# Patient Record
Sex: FEMALE | Race: BLACK OR AFRICAN AMERICAN | NOT HISPANIC OR LATINO | Employment: UNEMPLOYED | URBAN - METROPOLITAN AREA
[De-identification: names, ages, dates, MRNs, and addresses within clinical notes are randomized per-mention and may not be internally consistent; named-entity substitution may affect disease eponyms.]

---

## 2023-10-03 PROBLEM — O09.212 PREVIOUS PRETERM DELIVERY, ANTEPARTUM, SECOND TRIMESTER: Status: ACTIVE | Noted: 2023-10-03

## 2023-10-05 ENCOUNTER — OFFICE VISIT (OUTPATIENT)
Dept: MATERNAL FETAL MEDICINE | Facility: CLINIC | Age: 28
End: 2023-10-05
Payer: MEDICAID

## 2023-10-05 ENCOUNTER — PROCEDURE VISIT (OUTPATIENT)
Dept: MATERNAL FETAL MEDICINE | Facility: CLINIC | Age: 28
End: 2023-10-05
Payer: MEDICAID

## 2023-10-05 VITALS
HEIGHT: 69 IN | SYSTOLIC BLOOD PRESSURE: 104 MMHG | DIASTOLIC BLOOD PRESSURE: 64 MMHG | WEIGHT: 204 LBS | BODY MASS INDEX: 30.21 KG/M2 | HEART RATE: 88 BPM

## 2023-10-05 DIAGNOSIS — O28.3 ECHOGENIC INTRACARDIAC FOCUS OF FETUS ON PRENATAL ULTRASOUND: ICD-10-CM

## 2023-10-05 DIAGNOSIS — O30.042 DICHORIONIC DIAMNIOTIC TWIN PREGNANCY IN SECOND TRIMESTER: ICD-10-CM

## 2023-10-05 DIAGNOSIS — O09.90 AT HIGH RISK FOR COMPLICATIONS OF INTRAUTERINE PREGNANCY (IUP): ICD-10-CM

## 2023-10-05 DIAGNOSIS — O09.212 PREVIOUS PRETERM DELIVERY, ANTEPARTUM, SECOND TRIMESTER: Primary | ICD-10-CM

## 2023-10-05 PROCEDURE — 3008F PR BODY MASS INDEX (BMI) DOCUMENTED: ICD-10-PCS | Mod: CPTII,S$GLB,, | Performed by: OBSTETRICS & GYNECOLOGY

## 2023-10-05 PROCEDURE — 3008F BODY MASS INDEX DOCD: CPT | Mod: CPTII,S$GLB,, | Performed by: OBSTETRICS & GYNECOLOGY

## 2023-10-05 PROCEDURE — 1159F PR MEDICATION LIST DOCUMENTED IN MEDICAL RECORD: ICD-10-PCS | Mod: CPTII,S$GLB,, | Performed by: OBSTETRICS & GYNECOLOGY

## 2023-10-05 PROCEDURE — 3078F DIAST BP <80 MM HG: CPT | Mod: CPTII,S$GLB,, | Performed by: OBSTETRICS & GYNECOLOGY

## 2023-10-05 PROCEDURE — 76811 PR US, OB FETAL EVAL & EXAM, TRANSABDOM,FIRST GESTATION: ICD-10-PCS | Mod: S$GLB,,, | Performed by: OBSTETRICS & GYNECOLOGY

## 2023-10-05 PROCEDURE — 3074F SYST BP LT 130 MM HG: CPT | Mod: CPTII,S$GLB,, | Performed by: OBSTETRICS & GYNECOLOGY

## 2023-10-05 PROCEDURE — 99204 PR OFFICE/OUTPT VISIT, NEW, LEVL IV, 45-59 MIN: ICD-10-PCS | Mod: TH,S$GLB,, | Performed by: OBSTETRICS & GYNECOLOGY

## 2023-10-05 PROCEDURE — 76812 OB US DETAILED ADDL FETUS: ICD-10-PCS | Mod: S$GLB,,, | Performed by: OBSTETRICS & GYNECOLOGY

## 2023-10-05 PROCEDURE — 76811 OB US DETAILED SNGL FETUS: CPT | Mod: S$GLB,,, | Performed by: OBSTETRICS & GYNECOLOGY

## 2023-10-05 PROCEDURE — 3078F PR MOST RECENT DIASTOLIC BLOOD PRESSURE < 80 MM HG: ICD-10-PCS | Mod: CPTII,S$GLB,, | Performed by: OBSTETRICS & GYNECOLOGY

## 2023-10-05 PROCEDURE — 76812 OB US DETAILED ADDL FETUS: CPT | Mod: S$GLB,,, | Performed by: OBSTETRICS & GYNECOLOGY

## 2023-10-05 PROCEDURE — 3074F PR MOST RECENT SYSTOLIC BLOOD PRESSURE < 130 MM HG: ICD-10-PCS | Mod: CPTII,S$GLB,, | Performed by: OBSTETRICS & GYNECOLOGY

## 2023-10-05 PROCEDURE — 76817 TRANSVAGINAL US OBSTETRIC: CPT | Mod: S$GLB,,, | Performed by: OBSTETRICS & GYNECOLOGY

## 2023-10-05 PROCEDURE — 99204 OFFICE O/P NEW MOD 45 MIN: CPT | Mod: TH,S$GLB,, | Performed by: OBSTETRICS & GYNECOLOGY

## 2023-10-05 PROCEDURE — 1159F MED LIST DOCD IN RCRD: CPT | Mod: CPTII,S$GLB,, | Performed by: OBSTETRICS & GYNECOLOGY

## 2023-10-05 PROCEDURE — 76817 PR US, OB, TRANSVAG APPROACH: ICD-10-PCS | Mod: S$GLB,,, | Performed by: OBSTETRICS & GYNECOLOGY

## 2023-10-05 RX ORDER — INDOMETHACIN 25 MG/1
25 CAPSULE ORAL EVERY 8 HOURS
COMMUNITY
Start: 2023-09-24

## 2023-10-05 RX ORDER — ASPIRIN 81 MG/1
81 TABLET ORAL DAILY
Refills: 0 | COMMUNITY
Start: 2023-10-05 | End: 2024-07-11

## 2023-10-05 RX ORDER — ASCORBIC ACID, CHOLECALCIFEROL, DL-.ALPHA.-TOCOPHEROL ACETATE, THIAMINE HYDROCHLORIDE, RIBOFLAVIN, NIACINAMIDE, PYRIDOXINE HYDROCHLORIDE, FOLIC ACID, CYANOCOBALAMIN, CALCIUM CARBONATE, FERROUS FUMARATE, ZINC OXIDE, CUPRIC SULFATE 100; 400; 30; 1.6; 1.6; 20; 3.1; 1; 12; 200; 27; 10; 2 MG/1; [IU]/1; [IU]/1; MG/1; MG/1; MG/1; MG/1; MG/1; UG/1; MG/1; MG/1; MG/1; MG/1
1 TABLET, COATED ORAL
COMMUNITY
Start: 2023-06-27

## 2023-10-05 RX ORDER — PYRIDOXINE HCL (VITAMIN B6) 25 MG
25 TABLET ORAL
COMMUNITY
Start: 2023-07-24

## 2023-10-05 NOTE — PROGRESS NOTES
Maternal Fetal Medicine New Consult    Subjective     Patient ID: [id number]    Chief Complaint: MFM consult with US (H/o Twin pregnancy with PTD at 24 weeks and Twin Pregnancy. )      HPI 28 y.o.  female  at 22w2d gestation with Estimated Date of Delivery: 24. by LMP, consistent with early US. She is sent for MFM consultation for history of  delivery with twins at 24 weeks, currently pregnant with twins.  She had twins in her last pregnancy, and reports at 22 weeks she had normal OB visit and was noted to be fully dilated.  She was placed on bed rest in the hospital in Trendelenburg position.  She reports her water broke in the hospital and she delivered at 24 weeks via .  She reports baby a weighed 1 lb 3 oz and baby B weighed 14 oz. baby a survived until 8 months of age.  Baby B survived, with some developmental delays.  She is currently pregnant with twins.  She has increased BMI of 30 at consult visit.  She had some cramping about a week ago and was prescribed Indocin 25 mg every 8 hours, which she is still taking.  She denies any personal or family history of aneuploidy or anomalies. She denies any exposure to high fevers, viral rashes, illicit drugs or alcohol in this pregnancy.  She denies any leaking fluid, vaginal bleeding, contractions, decreased fetal movement. Denies headaches, visual disturbances, or epigastric pain.    Pregnancy complications include:   Patient Active Problem List   Diagnosis    Previous  delivery (with twins), antepartum, second trimester    Dichorionic diamniotic twin pregnancy in second trimester    At high risk for complications of intrauterine pregnancy (IUP)    Echogenic intracardiac focus of fetus on prenatal ultrasound        History reviewed. No pertinent past medical history.    Past Surgical History:   Procedure Laterality Date     SECTION  2017    twins    GALLBLADDER SURGERY  2019       Family History   Problem Relation Age of Onset  "   Hypertension Maternal Grandmother      labor Sister        Social History     Socioeconomic History    Marital status: Single   Tobacco Use    Smoking status: Former     Types: Cigarettes    Smokeless tobacco: Never   Substance and Sexual Activity    Alcohol use: Not Currently    Drug use: Never    Sexual activity: Yes     Partners: Male       Current Outpatient Medications   Medication Sig Dispense Refill    indomethacin (INDOCIN) 25 MG capsule Take 25 mg by mouth every 8 (eight) hours.       27 mg iron- 1 mg Tab Take 1 tablet by mouth.      VITAMIN B-6 25 MG tablet Take 25 mg by mouth.      aspirin (ECOTRIN) 81 MG EC tablet Take 1 tablet (81 mg total) by mouth once daily.  0     No current facility-administered medications for this visit.       Review of patient's allergies indicates:   Allergen Reactions    Penicillins Rash       Medications:  Current Outpatient Medications   Medication Instructions    aspirin (ECOTRIN) 81 mg, Oral, Daily    indomethacin (INDOCIN) 25 mg, Oral, Every 8 hours     27 mg iron- 1 mg Tab 1 tablet, Oral    VITAMIN B-6 25 mg, Oral       Review of Systems   12 point review of systems conducted, negative except as stated in the history of present illness. See HPI for details.      Objective     Visit Vitals  /64 (BP Location: Left arm, Patient Position: Sitting, BP Method: Large (Automatic))   Pulse 88   Ht 5' 9" (1.753 m)   Wt 92.5 kg (204 lb)   BMI 30.13 kg/m²        Physical Exam  Vitals and nursing note reviewed. Exam conducted with a chaperone present.   Constitutional:       General: She is not in acute distress.     Appearance: Normal appearance.      Comments: Increased BMI   HENT:      Head: Normocephalic and atraumatic.      Nose: Nose normal. No congestion.      Mouth/Throat:      Pharynx: Oropharynx is clear.   Eyes:      General: No scleral icterus.     Pupils: Pupils are equal, round, and reactive to light.   Cardiovascular:      Rate and Rhythm: " Normal rate and regular rhythm.   Pulmonary:      Effort: Pulmonary effort is normal.   Abdominal:      Palpations: Abdomen is soft.      Tenderness: There is no abdominal tenderness. There is no right CVA tenderness, left CVA tenderness or guarding.      Comments: No CVA tenderness gravid uterus.    Musculoskeletal:         General: Normal range of motion.      Cervical back: Neck supple.      Right lower leg: No edema.      Left lower leg: No edema.   Skin:     General: Skin is warm.      Findings: No bruising or rash.   Neurological:      General: No focal deficit present.      Mental Status: She is oriented to person, place, and time.      Deep Tendon Reflexes: Reflexes normal.      Comments: Normal reflexes   Psychiatric:         Mood and Affect: Mood normal.         Behavior: Behavior normal.         Thought Content: Thought content normal.         Judgment: Judgment normal.         ASSESSMENT/PLAN:     28 y.o.  female with IUP at 22w2d        Dichorionic diamniotic twin gestation  Fetal sizes are AGA and concordant.   The EFW percentile for twin A is 49%, and the EFW is 518 g.  The EFW percentile for twin B is 38%, and the EFW is 490 g.  AFV is normal.     Counseled on twin gestation pregnancies. She was advised of the rate of twins at 3.2% appears to have stabilized after increasing over the past few decades. 75% of twins are dizygotic and 25% monozygotic. Among the 25% monozygotic twins, 25% are dichorionic and 75% monochorionic (when embryo splits within 3 days of fertilization).    The higher risk of anomalies with twin gestation and 8% risks in spontaneous pregnancy loss was discussed. The reassurance obtained by the normal ultrasound, the limitations of ultrasound in checking anomalies and need for  evaluation was addressed. There is also higher risk of aneuploidy with dizygotic twins, which is twice the risk based on background maternal age or egg-donor age.    Additionally, the  increased risk of  labor and delivery was discussed.  There is no prevention recommended with no history of previous  delivery.      There is data to show that vaginal progesterone, is helpful for decreasing risk of  delivery with buckner, but data is not conclusive for twins and is still considered investigational at this time, . [place] cervical surveillance is not known. She was advised to come in at anytime if there is increased cramping, vaginal discharge, or bleeding.     Additionally, prophylactic cerclage, in an asymptomatic patient, even in the face of a short cervix is not recommended as it may increase risk of  delivery. Neither Bed rest nor tocolytic therapy are effective in prolonging pregnancy and are not recommended. Diagnostic tests that may predict  labor and delivery like fetal fibronectin test and transvaginal ultrasound are not recommended in the asymptomatic patients as they are not known to prolong pregnancy nor improve outcomes.    Use aspirin as discussed.    Additionally, there is higher risk of fetal growth restriction, hypertensive disorders, gestational diabetes mellitus, anemia, cerebral palsy postpartum hemorrhage and slight increase in maternal mortality.  I discussed with her of the need for serial follow up ultrasounds every 4 weeks till the end of pregnancy. If evidence of fetal growth restriction or other complications occur, then closer fetal surveillance will be needed.  If no additional risk factors are present, then optimal time of delivery is at 38 weeks with dichorionic diamniotic twins.      History of  delivery at 24 weeks with twins  I discussed with her the increased risk of recurrence of another  delivery with risk around 1/3. The risk ranges from 15% with one previous  delivery after 32 weeks that was followed by a term at birth to 60% with history of 2 consecutive  deliveries before 32 weeks. I have shared  with her that the  delivery could occur at an earlier gestational age with more significant morbidity and high risk of  mortality associated with that.    Discussed withdrawal of 17P by FDA and new guidelines for management of previous  delivery. If cervical length is less than 3 cm, weekly TVUS is recommended and may consider vaginal progesterone nightly. If cervix is less than 25 mm, consider cerclage, especially if previous  delivery less 28 weeks, than along with continuing vaginal progesterone nightly. Conversely, may consider nightly vaginal progesterone nightly starting at 16 weeks until 37 weeks regardless of cervical length, although no data of benefit if cervix longer than 3 cm.     TVUS today 10/05/2023  with normal closed cervix 4.3 cm without funneling at the IO. No evidence of cervical insufficiency at this time. PTL precautions given.      At high risk for preeclampsia  With her risk factors for preeclampsia including multifetal pregnancy , BMI over 30,  ancestry, low socioeconomic status, and previous low birthweight or SGA baby, discussed recommendations for low dose aspirin use to decrease risks for adverse outcomes, including preeclampsia, low birth rate and  delivery. Low-dose aspirin reduced the risk for preeclampsia by 24% in clinical trials and reduced the risk for  birth by 14% and FGR by 20%.  After discussing risks/benefits of its use, it was agreed to start asa 81 mg daily today and continue until delivery.. Also, recommend using in all future pregnancies.      EIF as isolated marker for aneuploidy (Twin B)  I discussed the significance of that finding with higher risk of Down syndrome about up to one-and-half to two-fold higher above what baseline risk is based on maternal age and or quad screen results.. Patient was offered genetic amniocentesis, after thorough counseling on benefits and risks of procedure, with very remote risk of  complications and in some studies no identifiable increased risk, above background risk of spontaneous miscarriage, while some current data suggests risk up to 1 in 800 with early amniocentesis prior to 24 weeks, and remote risk of need for emergency delivery with all the complications of prematurity with amniocentesis after 24 weeks. She declined amniocentesis . She is aware of the small risk of aneuploidy and need for  evaluation.  Additionally, the limitation of ultrasound in checking for anomalies and the need for  evaluation was emphasized.    She was counseled on Cell free DNA understanding that it is an enhanced screening test but not a diagnostic test. It assesses risk for common aneuploidies such as Trisomy 13, 18, and 21 by evaluating cell-free fetal DNA in maternal circulation.  Discussed the limited data on cell free DNA in twin pregnancies. ACOG suggests normal cfDNA in twin pregnancy can be reassuring for risk of Trisomy 21, but accuracy is not established for trisomy 13 and 18.  She declined any additional testing at this time.    Patient was advised that with a negative cell free DNA or negative quad screen and EIF, no additional testing is recommended.  However an amniocentesis was offered as above and declined    She was counseled that an echogenic intracardiac focus, if no aneuploidy is present and no congenital heart disease is confirmed at birth, does not have any long-term sequela and does not need any further evaluation.        Report of cramps 1 week ago  With no evidence of  labor and resolution of cramps, advised patient to stop taking Indocin at this time.      Follow up in about 4 weeks (around 2023) for [facility] follow-up, Repeat ultrasound, Twins.     Future Appointments   Date Time Provider Department Center   10/31/2023 10:30 AM Moustapha Atkins MD [facility] [place]   10/31/2023 10:30 AM ROOM 1 AND 2, [facility] [place]        Counseling on  dichorionic diamniotic twins was done with plan of care and discussed as above.  Patient will continue daily aspirin 81 mg to deliver repeated with cervical length over 4 cm, there is no benefit of vaginal progesterone note of continued Indocin at this time.  Patient is aware of the high risk of  labor and delivery with twins especially with a history of previous  delivery.  Expectant management will be done.  Counseling on EIF done.  We do not have copy of the quad screen or cell free DNA if done.  Will try to check with Dr. Rosales's office.  If quad screen or cell free DNA was done is negative no further testing would be recommended.  If not done, patient did not want to have any genetic testing at this time be The option of an amniocentesis was discussed and agreed to avoid.    Patient was evaluated by TAYLOR Pacheco and Dr. Atkins.  Final assessment and recommendations as stated above were made by Dr. Atkins.    Components of this note were documented using voice recognition systems; and are subject to errors not corrected at proof reading.  Please contact the author for any clarifications.

## 2023-10-30 DIAGNOSIS — O30.042 DICHORIONIC DIAMNIOTIC TWIN PREGNANCY IN SECOND TRIMESTER: Primary | ICD-10-CM

## 2023-10-31 ENCOUNTER — PROCEDURE VISIT (OUTPATIENT)
Dept: MATERNAL FETAL MEDICINE | Facility: CLINIC | Age: 28
End: 2023-10-31
Payer: MEDICAID

## 2023-10-31 ENCOUNTER — TELEPHONE (OUTPATIENT)
Dept: MATERNAL FETAL MEDICINE | Facility: CLINIC | Age: 28
End: 2023-10-31

## 2023-10-31 ENCOUNTER — OFFICE VISIT (OUTPATIENT)
Dept: MATERNAL FETAL MEDICINE | Facility: CLINIC | Age: 28
End: 2023-10-31
Payer: MEDICAID

## 2023-10-31 VITALS
HEART RATE: 96 BPM | SYSTOLIC BLOOD PRESSURE: 104 MMHG | BODY MASS INDEX: 30.21 KG/M2 | DIASTOLIC BLOOD PRESSURE: 69 MMHG | WEIGHT: 204 LBS | HEIGHT: 69 IN

## 2023-10-31 DIAGNOSIS — O30.042 DICHORIONIC DIAMNIOTIC TWIN PREGNANCY IN SECOND TRIMESTER: ICD-10-CM

## 2023-10-31 DIAGNOSIS — O09.212 PREVIOUS PRETERM DELIVERY, ANTEPARTUM, SECOND TRIMESTER: ICD-10-CM

## 2023-10-31 DIAGNOSIS — O09.90 AT HIGH RISK FOR COMPLICATIONS OF INTRAUTERINE PREGNANCY (IUP): Primary | ICD-10-CM

## 2023-10-31 DIAGNOSIS — O28.3 ECHOGENIC INTRACARDIAC FOCUS OF FETUS ON PRENATAL ULTRASOUND: ICD-10-CM

## 2023-10-31 PROCEDURE — 76816 OB US FOLLOW-UP PER FETUS: CPT | Mod: 59,S$GLB,, | Performed by: OBSTETRICS & GYNECOLOGY

## 2023-10-31 PROCEDURE — 3074F PR MOST RECENT SYSTOLIC BLOOD PRESSURE < 130 MM HG: ICD-10-PCS | Mod: CPTII,S$GLB,, | Performed by: OBSTETRICS & GYNECOLOGY

## 2023-10-31 PROCEDURE — 3078F PR MOST RECENT DIASTOLIC BLOOD PRESSURE < 80 MM HG: ICD-10-PCS | Mod: CPTII,S$GLB,, | Performed by: OBSTETRICS & GYNECOLOGY

## 2023-10-31 PROCEDURE — 76816 PR  US,PREGNANT UTERUS,F/U,TRANSABD APP: ICD-10-PCS | Mod: 59,S$GLB,, | Performed by: OBSTETRICS & GYNECOLOGY

## 2023-10-31 PROCEDURE — 3078F DIAST BP <80 MM HG: CPT | Mod: CPTII,S$GLB,, | Performed by: OBSTETRICS & GYNECOLOGY

## 2023-10-31 PROCEDURE — 3074F SYST BP LT 130 MM HG: CPT | Mod: CPTII,S$GLB,, | Performed by: OBSTETRICS & GYNECOLOGY

## 2023-10-31 PROCEDURE — 99213 PR OFFICE/OUTPT VISIT, EST, LEVL III, 20-29 MIN: ICD-10-PCS | Mod: TH,S$GLB,, | Performed by: OBSTETRICS & GYNECOLOGY

## 2023-10-31 PROCEDURE — 1159F PR MEDICATION LIST DOCUMENTED IN MEDICAL RECORD: ICD-10-PCS | Mod: CPTII,S$GLB,, | Performed by: OBSTETRICS & GYNECOLOGY

## 2023-10-31 PROCEDURE — 1159F MED LIST DOCD IN RCRD: CPT | Mod: CPTII,S$GLB,, | Performed by: OBSTETRICS & GYNECOLOGY

## 2023-10-31 PROCEDURE — 3008F BODY MASS INDEX DOCD: CPT | Mod: CPTII,S$GLB,, | Performed by: OBSTETRICS & GYNECOLOGY

## 2023-10-31 PROCEDURE — 99213 OFFICE O/P EST LOW 20 MIN: CPT | Mod: TH,S$GLB,, | Performed by: OBSTETRICS & GYNECOLOGY

## 2023-10-31 PROCEDURE — 3008F PR BODY MASS INDEX (BMI) DOCUMENTED: ICD-10-PCS | Mod: CPTII,S$GLB,, | Performed by: OBSTETRICS & GYNECOLOGY

## 2023-10-31 RX ORDER — PROMETHAZINE HYDROCHLORIDE 12.5 MG/1
25 TABLET ORAL 2 TIMES DAILY
COMMUNITY
Start: 2023-07-20

## 2023-10-31 NOTE — PROGRESS NOTES
Maternal Fetal Medicine Follow Up Consult    Subjective     Patient ID: [id number]    Chief Complaint: MFM gollow up w/us , PELVIC PRESSURE, and Pelvic Pain (Pt c/o loosing mucus plug, and slight bleeding )      HPI: Emma Ladd is a 28 y.o. female  at 26w0d gestation with Estimated Date of Delivery: 24  who is here for follow  up consultation by [facility].  She had twins in her last pregnancy, and reports at 22 weeks she had normal OB visit and was noted to be fully dilated.  She was placed on bed rest in the hospital in Trendelenburg position.  She reports her water broke in the hospital and she delivered at 24 weeks via .  She reports baby a weighed 1 lb 3 oz and baby B weighed 14 oz. baby a survived until 8 months of age.  Baby B survived, with some developmental delays.  She currently has dichorionic diamniotic twin gestation.  She has increased BMI of 30 at consult visit.  She had some cramping previously and was prescribed Indocin 25 mg every 8 hours. The cramps have resolved.  She had EIF on twin B, and she declined any genetic testing. She is on low dose aspirin daily.         Interval history since last M visit: None.. She denies any leaking fluid, vaginal bleeding, contractions, decreased fetal movement. Denies headaches, visual disturbances, or epigastric pain.    Pregnancy complications include:   Patient Active Problem List   Diagnosis    Previous  delivery (with twins), antepartum, second trimester    Dichorionic diamniotic twin pregnancy in second trimester    At high risk for complications of intrauterine pregnancy (IUP)    Echogenic intracardiac focus of fetus on prenatal ultrasound        No changes to medical, surgical, family, social, or obstetric history.    Medications:  Current Outpatient Medications   Medication Instructions    aspirin (ECOTRIN) 81 mg, Oral, Daily    indomethacin (INDOCIN) 25 mg, Oral, Every 8 hours    promethazine (PHENERGAN) 25 mg, Oral, 2 times  "daily     27 mg iron- 1 mg Tab 1 tablet, Oral    VITAMIN B-6 25 mg, Oral       Review of Systems   12 point review of systems conducted, negative except as stated in the history of present illness. See HPI for details.      Objective     Visit Vitals  /69 (BP Location: Left arm, Patient Position: Sitting, BP Method: Medium (Automatic))   Pulse 96   Ht 5' 9" (1.753 m)   Wt 92.5 kg (204 lb)   BMI 30.13 kg/m²          Physical Exam  Vitals and nursing note reviewed.   Constitutional:       Appearance: Normal appearance.      Comments: Increased BMI   HENT:      Head: Normocephalic and atraumatic.      Nose: Nose normal. No congestion.   Cardiovascular:      Rate and Rhythm: Normal rate.   Pulmonary:      Effort: Pulmonary effort is normal.   Skin:     Findings: No rash.   Neurological:      Mental Status: She is alert and oriented to person, place, and time.   Psychiatric:         Mood and Affect: Mood normal.         Behavior: Behavior normal.         Thought Content: Thought content normal.         Judgment: Judgment normal.           ASSESSMENT/PLAN:     28 y.o.  female with IUP at 26w0d    Dichorionic diamniotic twin gestation  Interval growth for both twins has been adequate.   Fetal sizes are AGA and concordant with FGD 2.5%.   The EFW percentile for twin A is 36%, and the EFW is 822 g on 10/31/2023.  The EFW percentile for twin B is 40%, and the EFW is 843 g on 10/31/2023.  AFV is normal.     She is aware of risks associated with twins including higher risk of anomalies, fetal growth restriction, hypertensive disorders, gestational diabetes, anemia, cerebral palsy,  labor/delivery and postpartum hemorrhage with twin gestation.  She is aware of the limitations of ultrasound, including checking anomalies, with need for  evaluation.    Low dose aspirin as discussed.    Due to increased risk factors, recommend continue serial follow-up ultrasounds every 4 weeks till end of " pregnancy.  If evidence of fetal growth restriction or other complications occur, then closer fetal surveillance will be needed.    If no additional risk factors are present, then optimal time of delivery is at 38 weeks with dichorionic diamniotic twins. The importance of doing fetal kick counts three times a day and resting in a lateral recumbent position and reporting immediately at any time there is any decreased fetal movement even if the same day of testing was emphasized. She is to come in at anytime if there is increased cramping, vaginal discharge, vaginal bleeding, or decreased fetal movement.         History of  delivery at 24 weeks with twins  She is history of previous  delivery and is aware of the risk of recurrence of  delivery.  She had cervical length surveillance, which ruled out cervical incompetence..  PTL precautions reviewed.    With cervix at 4.3 cm, patient elected to avoid vaginal progesterone at this time, after discussing potential benefits and risks and uncertainties of such use.       At high risk for preeclampsia  With her increased risk for preeclampsia, she agreed to continue asa 81 mg daily until delivery.. Preeclampsia precautions reviewed.        EIF as isolated marker for aneuploidy (Twin B)  She declined cfDNA . She declined amniocentesis . She is aware of the need for routine  evaluation.    Patient was advised that with a negative cell free DNA or negative quad screen and EIF, no additional testing is recommended.      She was counseled that an echogenic intracardiac focus, if no aneuploidy is present and no congenital heart disease is confirmed at birth, does not have any long-term sequela and does not need any further evaluation.      There is normal fetal growth on both fetuses.  We will get a copy of the  report in view of the high-risk of having a classical  with a 24 week  section.      Follow up in about 4 weeks  (around 11/28/2023) for [facility] follow-up, Repeat ultrasound, Twins.     Future Appointments   Date Time Provider Department Center   11/28/2023  2:00 PM Moustapha Atkins MD [facility] [place]   11/28/2023  2:00 PM ROOM 1 AND 2, [facility] [place]        [place] involvement: Patient was evaluated and examined by Dr. Atkins. TAYLOR Pacheco, helped in pre charting of part of note.    Components of this note were documented using voice recognition systems and are subject to errors not corrected at proofreading. Please contact the author for any clarifications.

## 2023-11-27 DIAGNOSIS — O30.042 DICHORIONIC DIAMNIOTIC TWIN PREGNANCY IN SECOND TRIMESTER: ICD-10-CM

## 2023-11-27 DIAGNOSIS — O09.212 PREVIOUS PRETERM DELIVERY, ANTEPARTUM, SECOND TRIMESTER: ICD-10-CM

## 2023-11-27 DIAGNOSIS — O28.3 ECHOGENIC INTRACARDIAC FOCUS OF FETUS ON PRENATAL ULTRASOUND: ICD-10-CM

## 2023-11-27 DIAGNOSIS — O09.90 AT HIGH RISK FOR COMPLICATIONS OF INTRAUTERINE PREGNANCY (IUP): Primary | ICD-10-CM

## 2023-11-28 ENCOUNTER — PROCEDURE VISIT (OUTPATIENT)
Dept: MATERNAL FETAL MEDICINE | Facility: CLINIC | Age: 28
End: 2023-11-28
Payer: MEDICAID

## 2023-11-28 ENCOUNTER — OFFICE VISIT (OUTPATIENT)
Dept: MATERNAL FETAL MEDICINE | Facility: CLINIC | Age: 28
End: 2023-11-28
Payer: MEDICAID

## 2023-11-28 VITALS
HEART RATE: 102 BPM | BODY MASS INDEX: 30.81 KG/M2 | HEIGHT: 69 IN | DIASTOLIC BLOOD PRESSURE: 63 MMHG | SYSTOLIC BLOOD PRESSURE: 110 MMHG | WEIGHT: 208 LBS

## 2023-11-28 DIAGNOSIS — O09.90 AT HIGH RISK FOR COMPLICATIONS OF INTRAUTERINE PREGNANCY (IUP): ICD-10-CM

## 2023-11-28 DIAGNOSIS — O30.042 DICHORIONIC DIAMNIOTIC TWIN PREGNANCY IN SECOND TRIMESTER: Primary | ICD-10-CM

## 2023-11-28 DIAGNOSIS — O09.212 PREVIOUS PRETERM DELIVERY, ANTEPARTUM, SECOND TRIMESTER: ICD-10-CM

## 2023-11-28 DIAGNOSIS — O28.3 ECHOGENIC INTRACARDIAC FOCUS OF FETUS ON PRENATAL ULTRASOUND: ICD-10-CM

## 2023-11-28 DIAGNOSIS — O30.042 DICHORIONIC DIAMNIOTIC TWIN PREGNANCY IN SECOND TRIMESTER: ICD-10-CM

## 2023-11-28 PROCEDURE — 99213 PR OFFICE/OUTPT VISIT, EST, LEVL III, 20-29 MIN: ICD-10-PCS | Mod: TH,S$GLB,, | Performed by: OBSTETRICS & GYNECOLOGY

## 2023-11-28 PROCEDURE — 99213 OFFICE O/P EST LOW 20 MIN: CPT | Mod: TH,S$GLB,, | Performed by: OBSTETRICS & GYNECOLOGY

## 2023-11-28 PROCEDURE — 3008F PR BODY MASS INDEX (BMI) DOCUMENTED: ICD-10-PCS | Mod: CPTII,S$GLB,, | Performed by: OBSTETRICS & GYNECOLOGY

## 2023-11-28 PROCEDURE — 3074F SYST BP LT 130 MM HG: CPT | Mod: CPTII,S$GLB,, | Performed by: OBSTETRICS & GYNECOLOGY

## 2023-11-28 PROCEDURE — 1159F MED LIST DOCD IN RCRD: CPT | Mod: CPTII,S$GLB,, | Performed by: OBSTETRICS & GYNECOLOGY

## 2023-11-28 PROCEDURE — 3008F BODY MASS INDEX DOCD: CPT | Mod: CPTII,S$GLB,, | Performed by: OBSTETRICS & GYNECOLOGY

## 2023-11-28 PROCEDURE — 3074F PR MOST RECENT SYSTOLIC BLOOD PRESSURE < 130 MM HG: ICD-10-PCS | Mod: CPTII,S$GLB,, | Performed by: OBSTETRICS & GYNECOLOGY

## 2023-11-28 PROCEDURE — 3078F PR MOST RECENT DIASTOLIC BLOOD PRESSURE < 80 MM HG: ICD-10-PCS | Mod: CPTII,S$GLB,, | Performed by: OBSTETRICS & GYNECOLOGY

## 2023-11-28 PROCEDURE — 76816 OB US FOLLOW-UP PER FETUS: CPT | Mod: S$GLB,,, | Performed by: OBSTETRICS & GYNECOLOGY

## 2023-11-28 PROCEDURE — 76816 PR  US,PREGNANT UTERUS,F/U,TRANSABD APP: ICD-10-PCS | Mod: 59,S$GLB,, | Performed by: OBSTETRICS & GYNECOLOGY

## 2023-11-28 PROCEDURE — 3078F DIAST BP <80 MM HG: CPT | Mod: CPTII,S$GLB,, | Performed by: OBSTETRICS & GYNECOLOGY

## 2023-11-28 PROCEDURE — 1159F PR MEDICATION LIST DOCUMENTED IN MEDICAL RECORD: ICD-10-PCS | Mod: CPTII,S$GLB,, | Performed by: OBSTETRICS & GYNECOLOGY

## 2023-11-28 RX ORDER — NIFEDIPINE 10 MG/1
10 CAPSULE ORAL EVERY 8 HOURS
COMMUNITY
Start: 2023-11-04

## 2023-11-28 NOTE — PROGRESS NOTES
Maternal Fetal Medicine Follow Up Consult    Subjective     Patient ID: [id number]    Chief Complaint: MFM follow up with US (H/o Twins.  Twin pregnancy.  Patient is having swelling , blurred vision, contractions dilated to 2 cm., fainting.)      HPI: Emma Ladd is a 28 y.o. female  at 30w0d gestation with Estimated Date of Delivery: 24  who is here for follow  up consultation by MFM.  She had twins in her last pregnancy, and reports at 22 weeks she had normal OB visit and was noted to be fully dilated.  She was placed on bed rest in the hospital in Trendelenburg position.  She reports her water broke in the hospital and she delivered at 24 weeks via .  We are requesting the op report in view of the high risk needing a classical  at 24 weeks. She reports baby a weighed 1 lb 3 oz and baby B weighed 14 oz. baby a survived until 8 months of age.  Baby B survived, with some developmental delays.  She currently has dichorionic diamniotic twin gestation.  She has increased BMI of 30 at consult visit.  She had some cramping previously and was prescribed Indocin 25 mg every 8 hours. The cramps have resolved.  She had EIF on twin B, and she declined any genetic testing. She is on low dose aspirin daily.         Interval history since last MFM visit:  Patient was seen in the hospital around 2023 for contractions where she was maybe about 2 cm dilated stable we will give her magnesium sulfate and steroids.  A follow-up visit on  showed no change in cervical dilatation.  No cramps today.. She denies any leaking fluid, vaginal bleeding, contractions, decreased fetal movement. Denies headaches, visual disturbances, or epigastric pain.    Pregnancy complications include:   Patient Active Problem List   Diagnosis    Previous  delivery (with twins), antepartum, second trimester    Dichorionic diamniotic twin pregnancy in second trimester    At high risk for  "complications of intrauterine pregnancy (IUP)    Echogenic intracardiac focus of fetus on prenatal ultrasound- NOT SEEN         No changes to medical, surgical, family, social, or obstetric history.    Medications:  Current Outpatient Medications   Medication Instructions    aspirin (ECOTRIN) 81 mg, Oral, Daily    indomethacin (INDOCIN) 25 mg, Oral, Every 8 hours    NIFEdipine (PROCARDIA) 10 mg, Oral, Every 8 hours    promethazine (PHENERGAN) 25 mg, Oral, 2 times daily     27 mg iron- 1 mg Tab 1 tablet, Oral    VITAMIN B-6 25 mg, Oral       Review of Systems   12 point review of systems conducted, negative except as stated in the history of present illness. See HPI for details.      Objective     Visit Vitals  /63 (BP Location: Left arm, Patient Position: Sitting, BP Method: Large (Automatic))   Pulse 102   Ht 5' 9" (1.753 m)   Wt 94.3 kg (208 lb)   BMI 30.72 kg/m²            Physical Exam  Vitals and nursing note reviewed.   Constitutional:       Appearance: Normal appearance.      Comments: Increased BMI   HENT:      Head: Normocephalic and atraumatic.      Nose: Nose normal. No congestion.   Cardiovascular:      Rate and Rhythm: Normal rate.   Pulmonary:      Effort: Pulmonary effort is normal.   Skin:     Findings: No rash.   Neurological:      Mental Status: She is alert and oriented to person, place, and time.   Psychiatric:         Mood and Affect: Mood normal.         Behavior: Behavior normal.         Thought Content: Thought content normal.         Judgment: Judgment normal.           ASSESSMENT/PLAN:     28 y.o.  female with IUP at 30w0d    Dichorionic diamniotic twin gestation  A = Fetal size is AGA with the EFW 1443g at the 33%.  AC is at the 23% on 2023.  MVP is normal.    B = Fetal size is AGA with the EFW 1493g at the 37%. AC is at the 65% on 2023.  MVP is normal.      Growth discordance is 3.3 %.     She is aware of risks associated with twins including higher " risk of anomalies, fetal growth restriction, hypertensive disorders, gestational diabetes, anemia, cerebral palsy,  labor/delivery and postpartum hemorrhage with twin gestation.  She is aware of the limitations of ultrasound, including checking anomalies, with need for  evaluation.    Low dose aspirin as discussed.    Due to increased risk factors, recommend continue serial follow-up ultrasounds every 4 weeks till end of pregnancy.  If evidence of fetal growth restriction or other complications occur, then closer fetal surveillance will be needed.    If no additional risk factors are present, then optimal time of delivery is at 38 weeks with dichorionic diamniotic twins. The importance of doing fetal kick counts three times a day and resting in a lateral recumbent position and reporting immediately at any time there is any decreased fetal movement even if the same day of testing was emphasized. She is to come in at anytime if there is increased cramping, vaginal discharge, vaginal bleeding, or decreased fetal movement.         History of  delivery at 24 weeks with twins  She is history of previous  delivery and is aware of the risk of recurrence of  delivery.  She had cervical length surveillance, which ruled out cervical incompetence..  PTL precautions reviewed.      At high risk for preeclampsia  With her increased risk for preeclampsia, she agreed to continue asa 81 mg daily until delivery.. Preeclampsia precautions reviewed.      EIF as isolated marker for aneuploidy (Twin B) - NOT SEEN   She declined cfDNA . She declined amniocentesis . She is aware of the need for routine  evaluation.        There is normal fetal growth on both fetuses.  We will get a copy of the  report in view of the high-risk of having a classical  with a 24 week  section.        There is normal fetal growth on both fetuses.  With the EIF not seen.  She is to  continue daily aspirin 81 mg.  Will plan to see her 1 more time in 4 weeks.  Preeclampsia warnings and fetal kick count instructions.   labor instructions.  Still trying to get a copy of the  section we will request record again.    Follow up in about 4 weeks (around 2023) for MFM follow-up, Repeat ultrasound, Twins.     No future appointments.       [place] involvement: Patient was evaluated by TAYLOR Pacheco and Dr. Atkins.  Final assessment and recommendations as stated above were made by Dr. Atkins.    Components of this note were documented using voice recognition systems and are subject to errors not corrected at proofreading. Please contact the author for any clarifications.

## 2023-12-01 ENCOUNTER — DOCUMENTATION ONLY (OUTPATIENT)
Dept: MATERNAL FETAL MEDICINE | Facility: CLINIC | Age: 28
End: 2023-12-01
Payer: MEDICAID

## 2023-12-01 NOTE — PROGRESS NOTES
Operative report from patient's previous  from 2017 received.  Confirm that it was a classical  with a vertical incision.  We will address this at her next visit on 2023.

## 2023-12-18 DIAGNOSIS — O28.3 ECHOGENIC INTRACARDIAC FOCUS OF FETUS ON PRENATAL ULTRASOUND: Primary | ICD-10-CM

## 2023-12-18 DIAGNOSIS — O30.042 DICHORIONIC DIAMNIOTIC TWIN PREGNANCY IN SECOND TRIMESTER: ICD-10-CM

## 2023-12-18 DIAGNOSIS — O09.212 PREVIOUS PRETERM DELIVERY, ANTEPARTUM, SECOND TRIMESTER: ICD-10-CM

## 2023-12-26 PROBLEM — O09.213 PREVIOUS PRETERM DELIVERY IN THIRD TRIMESTER, ANTEPARTUM: Status: ACTIVE | Noted: 2023-10-03

## 2023-12-26 PROBLEM — Z98.891 HISTORY OF CLASSICAL CESAREAN SECTION: Status: ACTIVE | Noted: 2023-12-26

## 2023-12-26 PROBLEM — O28.3 ECHOGENIC INTRACARDIAC FOCUS OF FETUS ON PRENATAL ULTRASOUND: Status: RESOLVED | Noted: 2023-10-05 | Resolved: 2023-12-26

## 2023-12-26 PROBLEM — O30.043 DICHORIONIC DIAMNIOTIC TWIN PREGNANCY IN THIRD TRIMESTER: Status: ACTIVE | Noted: 2023-10-05

## 2023-12-29 ENCOUNTER — OFFICE VISIT (OUTPATIENT)
Dept: MATERNAL FETAL MEDICINE | Facility: CLINIC | Age: 28
End: 2023-12-29
Payer: MEDICAID

## 2023-12-29 ENCOUNTER — PROCEDURE VISIT (OUTPATIENT)
Dept: MATERNAL FETAL MEDICINE | Facility: CLINIC | Age: 28
End: 2023-12-29
Payer: MEDICAID

## 2023-12-29 VITALS
SYSTOLIC BLOOD PRESSURE: 123 MMHG | WEIGHT: 216.81 LBS | DIASTOLIC BLOOD PRESSURE: 78 MMHG | HEART RATE: 85 BPM | HEIGHT: 69 IN | BODY MASS INDEX: 32.11 KG/M2

## 2023-12-29 DIAGNOSIS — O36.5931 POOR FETAL GROWTH AFFECTING MANAGEMENT OF MOTHER IN THIRD TRIMESTER, FETUS 1 OF MULTIPLE GESTATION: Primary | ICD-10-CM

## 2023-12-29 DIAGNOSIS — O30.042 DICHORIONIC DIAMNIOTIC TWIN PREGNANCY IN SECOND TRIMESTER: ICD-10-CM

## 2023-12-29 DIAGNOSIS — O36.5932 POOR FETAL GROWTH AFFECTING MANAGEMENT OF MOTHER IN THIRD TRIMESTER, FETUS 2: ICD-10-CM

## 2023-12-29 DIAGNOSIS — O28.3 ECHOGENIC INTRACARDIAC FOCUS OF FETUS ON PRENATAL ULTRASOUND: ICD-10-CM

## 2023-12-29 DIAGNOSIS — O36.5931 POOR FETAL GROWTH AFFECTING MANAGEMENT OF MOTHER IN THIRD TRIMESTER, FETUS 1 OF MULTIPLE GESTATION: ICD-10-CM

## 2023-12-29 DIAGNOSIS — O30.043 DICHORIONIC DIAMNIOTIC TWIN PREGNANCY IN THIRD TRIMESTER: ICD-10-CM

## 2023-12-29 DIAGNOSIS — O09.212 PREVIOUS PRETERM DELIVERY, ANTEPARTUM, SECOND TRIMESTER: ICD-10-CM

## 2023-12-29 DIAGNOSIS — O09.90 AT HIGH RISK FOR COMPLICATIONS OF INTRAUTERINE PREGNANCY (IUP): Primary | ICD-10-CM

## 2023-12-29 DIAGNOSIS — O09.213 PREVIOUS PRETERM DELIVERY IN THIRD TRIMESTER, ANTEPARTUM: ICD-10-CM

## 2023-12-29 DIAGNOSIS — O28.3 ABNORMAL PRENATAL ULTRASOUND: ICD-10-CM

## 2023-12-29 DIAGNOSIS — Z98.891 HISTORY OF CLASSICAL CESAREAN SECTION: ICD-10-CM

## 2023-12-29 PROBLEM — O36.5930 POOR FETAL GROWTH AFFECTING MANAGEMENT OF MOTHER IN THIRD TRIMESTER: Status: ACTIVE | Noted: 2023-12-29

## 2023-12-29 PROCEDURE — 3008F PR BODY MASS INDEX (BMI) DOCUMENTED: ICD-10-PCS | Mod: CPTII,S$GLB,,

## 2023-12-29 PROCEDURE — 76819 FETAL BIOPHYS PROFIL W/O NST: CPT | Mod: S$GLB,,, | Performed by: OBSTETRICS & GYNECOLOGY

## 2023-12-29 PROCEDURE — 1160F PR REVIEW ALL MEDS BY PRESCRIBER/CLIN PHARMACIST DOCUMENTED: ICD-10-PCS | Mod: CPTII,S$GLB,,

## 2023-12-29 PROCEDURE — 3078F DIAST BP <80 MM HG: CPT | Mod: CPTII,S$GLB,,

## 2023-12-29 PROCEDURE — 76820 UMBILICAL ARTERY ECHO: CPT | Mod: S$GLB,,, | Performed by: OBSTETRICS & GYNECOLOGY

## 2023-12-29 PROCEDURE — 76819 FETAL BIOPHYS PROFIL W/O NST: CPT | Mod: 59,S$GLB,, | Performed by: OBSTETRICS & GYNECOLOGY

## 2023-12-29 PROCEDURE — 3074F SYST BP LT 130 MM HG: CPT | Mod: CPTII,S$GLB,,

## 2023-12-29 PROCEDURE — 76816 OB US FOLLOW-UP PER FETUS: CPT | Mod: S$GLB,,, | Performed by: OBSTETRICS & GYNECOLOGY

## 2023-12-29 PROCEDURE — 3008F BODY MASS INDEX DOCD: CPT | Mod: CPTII,S$GLB,,

## 2023-12-29 PROCEDURE — 1159F MED LIST DOCD IN RCRD: CPT | Mod: CPTII,S$GLB,,

## 2023-12-29 PROCEDURE — 76816 OB US FOLLOW-UP PER FETUS: CPT | Mod: 59,S$GLB,, | Performed by: OBSTETRICS & GYNECOLOGY

## 2023-12-29 PROCEDURE — 1160F RVW MEDS BY RX/DR IN RCRD: CPT | Mod: CPTII,S$GLB,,

## 2023-12-29 PROCEDURE — 1159F PR MEDICATION LIST DOCUMENTED IN MEDICAL RECORD: ICD-10-PCS | Mod: CPTII,S$GLB,,

## 2023-12-29 PROCEDURE — 3078F PR MOST RECENT DIASTOLIC BLOOD PRESSURE < 80 MM HG: ICD-10-PCS | Mod: CPTII,S$GLB,,

## 2023-12-29 PROCEDURE — 3074F PR MOST RECENT SYSTOLIC BLOOD PRESSURE < 130 MM HG: ICD-10-PCS | Mod: CPTII,S$GLB,,

## 2023-12-29 PROCEDURE — 99215 OFFICE O/P EST HI 40 MIN: CPT | Mod: TH,S$GLB,,

## 2023-12-29 PROCEDURE — 99215 PR OFFICE/OUTPT VISIT, EST, LEVL V, 40-54 MIN: ICD-10-PCS | Mod: TH,S$GLB,,

## 2023-12-29 NOTE — PROGRESS NOTES
Maternal Fetal Medicine Follow Up    Subjective     Patient ID: [id number]    Chief Complaint: mfm followup w/us      HPI: Emma Ladd is a 28 y.o. female  at 34w3d gestation with Estimated Date of Delivery: 24  who is here for follow  up consultation by M.    She had twins in her last pregnancy, and reports at 22 weeks she had normal OB visit and was noted to be fully dilated.  She was placed on bed rest in the hospital in Trendelenburg position.  She reports her water broke in the hospital and she delivered at 24 weeks via .  We reviewed the op report from this delivery and confirmed that  was classical.  She reports Baby A weighed 1 lb 3 oz and Baby B weighed 14 oz. Baby A survived until 8 months of age.  Baby B survived, with some developmental delays.  She currently has dichorionic diamniotic twin gestation.  She has increased BMI of 30 at consult visit. She had EIF on twin B, not seen on follow-up ultrasound, and she declined any genetic testing. She is on low dose aspirin daily. She reports she went to the hospital on 23 with some cramping. She was given IV fluids and discharged in stable condition. She reports resolution of cramps.       Interval history since last M visit: None.. She denies any leaking fluid, vaginal bleeding, contractions, decreased fetal movement. Denies headaches, visual disturbances, or epigastric pain.    Pregnancy complications include:   Patient Active Problem List   Diagnosis    Previous  delivery in third trimester (with twins), antepartum    Dichorionic diamniotic twin pregnancy in third trimester    At high risk for complications of intrauterine pregnancy (IUP)    History of classical  section    Poor fetal growth affecting management of mother in third trimester    Poor fetal growth affecting management of mother in third trimester, fetus 2    Absent end-diastolic flow on umbilical artery Doppler on Twin B        No  "changes to medical, surgical, family, social, or obstetric history.    Medications:  Current Outpatient Medications   Medication Instructions    aspirin (ECOTRIN) 81 mg, Oral, Daily    indomethacin (INDOCIN) 25 mg, Oral, Every 8 hours    NIFEdipine (PROCARDIA) 10 mg, Oral, Every 8 hours    promethazine (PHENERGAN) 25 mg, Oral, 2 times daily     27 mg iron- 1 mg Tab 1 tablet, Oral    VITAMIN B-6 25 mg, Oral       Review of Systems   12 point review of systems conducted, negative except as stated in the history of present illness. See HPI for details.      Objective     Visit Vitals  /78 (BP Location: Left arm, Patient Position: Sitting, BP Method: Large (Automatic))   Pulse 85   Ht 5' 9" (1.753 m)   Wt 98.3 kg (216 lb 12.8 oz)   BMI 32.02 kg/m²          Physical Exam  Vitals and nursing note reviewed.   Constitutional:       Appearance: Normal appearance.      Comments: Increased BMI   HENT:      Head: Normocephalic and atraumatic.      Nose: Nose normal. No congestion.   Cardiovascular:      Rate and Rhythm: Normal rate.   Pulmonary:      Effort: Pulmonary effort is normal.   Skin:     Findings: No rash.   Neurological:      Mental Status: She is alert and oriented to person, place, and time.   Psychiatric:         Mood and Affect: Mood normal.         Behavior: Behavior normal.         Thought Content: Thought content normal.         Judgment: Judgment normal.         ASSESSMENT/PLAN:     28 y.o.  female with IUP at 34w3d    Dichorionic diamniotic twin gestation  A = Mild fetal growth restriction with the EFW 1970 g at the 8%.  AC is at the 7% on 2023.  BPP is reassuring at . MVP is normal.  Normal umbilical artery Doppler.    B = Severe fetal growth restriction with the EFW 1571 g at the 2%. AC is at the <1% on 2023.  BPP is reassuring at . MVP is normal    Abnormal umbilical artery Doppler with absent end diastolic flow.    Growth discordance is 20.2 %.     She is aware of " risks associated with twins including higher risk of anomalies, fetal growth restriction, hypertensive disorders, gestational diabetes, anemia, cerebral palsy,  labor/delivery and postpartum hemorrhage with twin gestation.  She is aware of the limitations of ultrasound, including checking anomalies, with need for  evaluation.    Low dose aspirin as discussed.    The importance of doing fetal kick counts three times a day and resting in a lateral recumbent position and reporting immediately at any time there is any decreased fetal movement even if the same day of testing was emphasized. She is to come in at anytime if there is increased cramping, vaginal discharge, vaginal bleeding, or decreased fetal movement.       Delivery recommendation as below.      Mild FGR on Twin A and severe FGR on Twin B (with AEDF on UAD on Twin B)  I discussed potential etiologies of FGR include but are not limited to normal variation of stature, placental insufficiency, chromosomal abnormalities, genetic disorders, infections, medical conditions, teratogen exposure and other etiologies.  We discussed the increased risk of stillbirth and the potential need for earlier delivery.The potential for constitutional factor as a contributing factor was addressed in addition to other contributing factors such as conditions predisposing to vascular disease such as pregestational diabetes, chronic renal disease, autoimmune disorders; umbilical cord abnormalities; substance use; and multiple gestation. Although uteroplacental insufficiency and/or constitutional factors (if relevant) are the likely etiology, the potential for anomalies not seen with the ultrasound, aneuploidy, or in-utero viral infections are there, but these are very unlikely to be the cause with no other ultrasound findings. Based on a study in 2018, there is an abnormal microarray in 3% of isolated FGR. I discussed and offered genetic amniocentesis, to check for  microarray and CMV was offered. The benefits and risks were discussed. She declined amniocentesis . She was advised of need for  evaluation.    She was counseled on Cell free DNA understanding that it is an enhanced screening test but not a diagnostic test. It assesses risk for common aneuploidies such as Trisomy 13, 18, and 21 by evaluating cell-free fetal DNA in maternal circulation with a false positive rate less than 0.5% for Trisomy 21 and less reliable for Trisomy 13 and Trisomy 18. She declined cell free DNA with proximity to delivery.    Complications of growth-restricted neonates include hypoglycemia, hyperbilirubinemia, hypothermia, seizures, sepsis, IVH, RDS, necrotizing enterocolitis, and  asphyxia. Long-term complications include cognitive delay and adult diseases such as cardiovascular disorders and type 2 diabetes. There is an increased risk (~20%) for recurrence of fetal growth restriction in a future pregnancy.    She was instructed that fetal testing is not a 100% protective against the risk of fetal demise in-utero. The importance of doing fetal kick counts three times a day and resting in a lateral recumbent position and reporting immediately at any time there is any decreased fetal movement even if the same day of testing was emphasized. Her questions were answered.    With fetal growth restriction on both twins and severe fetal growth restriction on twin B with absent end-diastolic flow on umbilical artery Doppler, recommend proceeding with delivery at this time as risks of prematurity are outweighed by risks of continued pregnancy. She already received a course of steroids around 25 weeks gestation. There is no indication for a second course of steroids at this time. She reports she has not had anything to eat or drink today. Advised patient to go to the hospital at this time for delivery via  section.  Discussed with Dr. Atkins who discussed with Dr. Rosales, and she  will be going to the hospital at this time.      History of  delivery at 24 weeks with twins  She is history of previous  delivery and is aware of the risk of recurrence of  delivery.  She had cervical length surveillance, which ruled out cervical incompetence.  PTL precautions reviewed.      History of classical  section  Patient was counseled that a previous classical  carries with it risk of uterine rupture around 5% with labor with need to avoid that close to term.     She is going to the hospital for delivery at this time as above.      At high risk for preeclampsia  With her increased risk for preeclampsia, she agreed to continue asa 81 mg daily until delivery. Preeclampsia precautions reviewed.      Follow up in about 1 week (around 2024) for MFM follow-up, BPP, UAD, Twins.     No future appointments.     Jenn Costa PA-C     Components of this note were documented using voice recognition systems and are subject to errors not corrected at proofreading. Please contact the author for any clarifications.

## 2024-04-01 PROBLEM — O36.5930 POOR FETAL GROWTH AFFECTING MANAGEMENT OF MOTHER IN THIRD TRIMESTER: Status: RESOLVED | Noted: 2023-12-29 | Resolved: 2024-04-01
